# Patient Record
Sex: MALE | Race: WHITE
[De-identification: names, ages, dates, MRNs, and addresses within clinical notes are randomized per-mention and may not be internally consistent; named-entity substitution may affect disease eponyms.]

---

## 2019-01-15 ENCOUNTER — HOSPITAL ENCOUNTER (OUTPATIENT)
Dept: HOSPITAL 84 - D.HCCARDIO | Age: 84
Discharge: HOME | End: 2019-01-15
Attending: INTERNAL MEDICINE
Payer: MEDICARE

## 2019-01-15 VITALS — BODY MASS INDEX: 25.8 KG/M2

## 2019-01-15 DIAGNOSIS — I25.119: Primary | ICD-10-CM

## 2019-01-15 DIAGNOSIS — I10: ICD-10-CM

## 2019-01-16 NOTE — ST
PATIENT:CINDI DIALLO                       MEDICAL RECORD: [id number]
SEX: M                                                   [facility]         
ORDER #:                                                 ADMISSION DATE: 01/15/19
AGE OF PATIENT: 83            
 
REFERRING PHYSICIAN:                                                             
 
INTERPRETING PHYSICIAN: JOCELIN BARBA MD             

 
 
DATE OF SERVICE:  01/15/2019
 
PROCEDURE:  Nuclear Stress Test.
 
INDICATION:  Angina, coronary artery disease, shortness of breath, and
hypertension.  It was pharmacologic.
 
DESCRIPTION:  The patient was exercised on standard Lexiscan protocol with 30
mCi of sestamibi injected at peak stress, 11 mCi were used previously for rest
images.
 
FINDINGS:  Gated SPECT reveals an excellent ejection fraction at 67% with good
wall motion thickening and brightness throughout the segments. 
 
SPECT IMAGING:  Cardiolite was used for a myocardial perfusion agent.  There is
homogeneous uptake throughout all segments at rest and stress with no evidence
of inducible ischemia or previous infarction. 
 
OVERALL IMPRESSION:  This is a normal rest-stress Cardiolite with no evidence of
inducible ischemia or previous infarction.  A gated SPECT reveals preserved
ejection fraction greater than 60%. 
 
At this time I would evaluate noncardiac etiology of chest pain.
 
TRANSINT:[id number] Voice Confirmation ID: [id number] DOCUMENT ID: [id number]
 
 
                                           
                                           JOCELIN BARBA MD             
 
 
 
Electronically Signed by JOCELIN BARBA on 01/16/19 at 1639
 
 
 
 
 
 
CC: VICENTE CAMERON MD                                            [id number]
DICTATION DATE: 01/15/19 1613     :     01/16/19 0746      [facility] CLI 
                                                                      01/15/19
[facility]                                          
[address] [place] [address]

## 2019-04-29 ENCOUNTER — HOSPITAL ENCOUNTER (OUTPATIENT)
Dept: HOSPITAL 84 - D.ECHO | Age: 84
Discharge: HOME | End: 2019-04-29
Attending: INTERNAL MEDICINE
Payer: MEDICARE

## 2019-04-29 VITALS — BODY MASS INDEX: 25.8 KG/M2

## 2019-04-29 DIAGNOSIS — D64.9: ICD-10-CM

## 2019-04-29 DIAGNOSIS — D47.1: ICD-10-CM

## 2019-04-29 DIAGNOSIS — C91.10: Primary | ICD-10-CM

## 2019-05-01 NOTE — EC
PATIENT:CINDI DIALLO             DATE OF SERVICE: 04/29/19
SEX: M                                  MEDICAL RECORD: [id number]
DATE OF BIRTH: [birth year]                        [facility]          
AGE OF PATIENT: 83                             ADMISSION DATE: 04/29/19
 
REFERRING PHYSICIAN:                               
 
INTERPRETING PHYSICIAN: JOCELIN JENKINS MD             
 
 
 
                             ECHOCARDIOGRAM REPORT
  ECHO CHARGES 4               ECHO COMPLETE                 Date: 04/29/19
 
 
 
CLINICAL DIAGNOSIS: ASSESS EF, EDEMA              
 
                         ECHOCARDIOGRAPHIC MEASUREMENTS
      (adult normal given)
   AC root (d.<3.7cm) 3.1  cm   LV Septum d (<1.2 cm> 1.4  cm
      Valve Excursion 1.7  cm     LV Septum (systole) 1.5  cm
Left Atria (s.<4.0cm> 3.9  cm          LVPW d(<1.2cm) 1.1  cm
        RV (d.<2.3cm) 3.1  cm           LVPW (sytole) 1.8  cm
  LV diastole(<5.6CM) 5.6  cm       MV E-F(>70mm/sec)      cm
           LV systole 4.3  cm           LVOT Diameter 1.8  cm
       MV exc.(>10mm)      cm
Est.ejection fraction (50-75%)     %
 
   DOPPLER:
     LVIT      cm/sec A 62   cm/sec E 62    cm/sec
       LA      cm/sec      RVSP 38.6 mmHg
     LVOT 101  cm/sec   AOP1/2T      m/s
  Asc. Ao 135  cm/sec
     RVOT 48   cm/sec
       RA      cm/sec
       PA 89   cm/sec
 AV Gradient Peak 7.3  mmHg  AV Mean 3.8  mmHg  AV Area 2.0  cm
 MV Gradient Peak 2.8  mmHg  MV Mean 1.3  mmHg  MV Area      cm
   COMMENTS:                                              
 
 
 Cardiac Sonographer: Dottie JONES             
      Cardiologist: 1          Dr. Jenkins                
             TAPE# PACS           
                                       Pericardial Effusion N                        
 
 
DATE OF SERVICE:  04/29/2019
 
FINDINGS:
1.  Left ventricular chamber size is within normal limits.  Left ventricular
systolic function is normal.  Overall ejection fraction is estimated at 55%.
2.  Left atrium is within normal limits.  Right atrium and right ventricular
chamber sizes are mildly dilated.
3.  Valvular structures have normal structure and motion.
4.  Doppler interrogation reveals mild mitral regurgitation and mild tricuspid
regurgitation.  No other valvular insufficiency or stenosis.  Pulmonary systolic
 
 
 
ECHOCARDIOGRAM REPORT                          [id number]    CINDI DIALLO     
 
 
pressure is estimated at 38 mmHg.
5.  No evidence of pericardial effusion or left ventricular thrombus.
 
TRANSINT:[id number] Voice Confirmation ID: [id number] DOCUMENT ID: [id number]
                                           
                                           JOCELIN JENKINS MD             
 
 
 
Electronically Signed by JOCELIN JENKINS on 05/01/19 at 1039
 
 
 
 
 
 
 
 
 
 
 
 
 
 
 
 
 
 
 
 
 
 
 
 
 
 
 
 
 
 
 
 
 
 
 
 
CC:                                                             [id number]
DICTATION DATE: 04/29/19 1210     :     04/29/19 1417      DEP CLI 
                                                                      04/29/19
[place] [id number] [place] [phone]

## 2019-10-22 ENCOUNTER — HOSPITAL ENCOUNTER (OUTPATIENT)
Dept: HOSPITAL 84 - D.ECHO | Age: 84
Discharge: HOME | End: 2019-10-22
Attending: ORTHOPAEDIC SURGERY
Payer: OTHER GOVERNMENT

## 2019-10-22 VITALS — BODY MASS INDEX: 25.8 KG/M2

## 2019-10-22 DIAGNOSIS — I25.9: ICD-10-CM

## 2019-10-22 DIAGNOSIS — C91.10: Primary | ICD-10-CM

## 2019-10-22 LAB
BASOPHILS NFR BLD AUTO: 0.2 % (ref 0–2)
EOSINOPHIL NFR BLD: 0 % (ref 0–7)
ERYTHROCYTE [DISTWIDTH] IN BLOOD BY AUTOMATED COUNT: 15.5 % (ref 11.5–14.5)
HCT VFR BLD CALC: 39.5 % (ref 42–54)
HGB BLD-MCNC: 12.7 G/DL (ref 13.5–17.5)
IMM GRANULOCYTES NFR BLD: 0.2 % (ref 0–5)
LYMPHOCYTES NFR BLD AUTO: 16.7 % (ref 15–50)
MCH RBC QN AUTO: 28.5 PG (ref 26–34)
MCHC RBC AUTO-ENTMCNC: 32.2 G/DL (ref 31–37)
MCV RBC: 88.8 FL (ref 80–100)
MONOCYTES NFR BLD: 10.4 % (ref 2–11)
NEUTROPHILS NFR BLD AUTO: 72.5 % (ref 40–80)
PLATELET # BLD: 113 10X3/UL (ref 130–400)
PMV BLD AUTO: 9.5 FL (ref 7.4–10.4)
RBC # BLD AUTO: 4.45 10X6/UL (ref 4.2–6.1)
WBC # BLD AUTO: 4.2 10X3/UL (ref 4.8–10.8)

## 2019-10-29 NOTE — EC
PATIENT:CINDI DIALLO             DATE OF SERVICE: 10/22/19
SEX: M                                  MEDICAL RECORD: [id number]
DATE OF BIRTH: [birth year]                        [facility]          
AGE OF PATIENT: 83                             ADMISSION DATE: 10/22/19
 
REFERRING PHYSICIAN:                               
 
INTERPRETING PHYSICIAN: JOCELIN JENKINS MD             
 
 
 
                             ECHOCARDIOGRAM REPORT
  ECHO CHARGES 4               ECHO COMPLETE                 Date: 10/22/19
 
 
 
CLINICAL DIAGNOSIS: ISHEMIC HEART DISEASE,        
                    LEUKEMIA                      
                         ECHOCARDIOGRAPHIC MEASUREMENTS
      (adult normal given)
   AC root (d.<3.7cm) 3.1  cm   LV Septum d (<1.2 cm> 0.8  cm
      Valve Excursion 1.7  cm     LV Septum (systole) 1.5  cm
Left Atria (s.<4.0cm> 4.1  cm          LVPW d(<1.2cm) 1.3  cm
        RV (d.<2.3cm) 3.3  cm           LVPW (sytole) 1.8  cm
  LV diastole(<5.6CM) 6.3  cm       MV E-F(>70mm/sec)      cm
           LV systole 3.8  cm           LVOT Diameter 2.0  cm
       MV exc.(>10mm)      cm
Est.ejection fraction (50-75%)     %
 
   DOPPLER:
     LVIT      cm/sec A 52   cm/sec E 39    cm/sec
       LA      cm/sec      RVSP 29.6 mmHg
     LVOT 93   cm/sec   AOP1/2T      m/s
  Asc. Ao 130  cm/sec
     RVOT 49   cm/sec
       RA      cm/sec
       PA 86   cm/sec
 AV Gradient Peak 6.8  mmHg  AV Mean 3.5  mmHg  AV Area 2.2  cm
 MV Gradient Peak 2.3  mmHg  MV Mean 0.8  mmHg  MV Area      cm
   COMMENTS:                                              
 
 
 Cardiac Sonographer: Dottie JONES             
      Cardiologist: 1          Dr. Jenkins                
             TAPE# PACS           
                                       Pericardial Effusion N                        
 
 
DATE OF SERVICE:  10/22/2019
 
FINDINGS:
1.  Left ventricular chamber size is mildly dilated.  Left ventricular systolic
function is preserved at 55%.
2.  Left atrium is enlarged at 4.1 cm.  Right atrium and right ventricular
chamber sizes are as well mildly dilated.
3.  Valvular structures have normal structure and motion.
4.  Doppler interrogation reveals mild mitral regurgitation, no other valvular
insufficiency or stenosis.  Pulmonary systolic pressure is estimated at 30 mmHg.
 
 
 
ECHOCARDIOGRAM REPORT                          [id number]    CINDI DIALLO     
 
 
5.  No evidence of pericardial effusion or left ventricular thrombus.
 
TRANSINT:[id number] Voice Confirmation ID: [id number] DOCUMENT ID: [id number]
                                           
                                           JOCELIN JENKINS MD             
 
 
 
Electronically Signed by JOCELIN JENKINS on 10/29/19 at 1140
 
 
 
 
 
 
 
 
 
 
 
 
 
 
 
 
 
 
 
 
 
 
 
 
 
 
 
 
 
 
 
 
 
 
 
 
 
CC:                                                             [id number]
DICTATION DATE: 10/22/19 1215     :     10/22/19 1224      DEP CLI 
                                                                      10/22/19
[place] [id number] [place] [phone]

## 2020-05-08 ENCOUNTER — HOSPITAL ENCOUNTER (OUTPATIENT)
Dept: HOSPITAL 84 - D.HCCECHO | Age: 85
Discharge: HOME | End: 2020-05-08
Attending: INTERNAL MEDICINE
Payer: MEDICARE

## 2020-05-08 VITALS — BODY MASS INDEX: 25.8 KG/M2

## 2020-05-08 DIAGNOSIS — I25.10: Primary | ICD-10-CM

## 2020-05-11 ENCOUNTER — HOSPITAL ENCOUNTER (OUTPATIENT)
Dept: HOSPITAL 84 - D.HCCARDIO | Age: 85
Discharge: HOME | End: 2020-05-11
Attending: INTERNAL MEDICINE
Payer: MEDICARE

## 2020-05-11 VITALS — BODY MASS INDEX: 25.8 KG/M2

## 2020-05-11 DIAGNOSIS — I25.10: Primary | ICD-10-CM

## 2020-05-22 ENCOUNTER — HOSPITAL ENCOUNTER (OUTPATIENT)
Dept: HOSPITAL 84 - D.OPS | Age: 85
Discharge: HOME | End: 2020-05-22
Attending: INTERNAL MEDICINE
Payer: MEDICARE

## 2020-05-22 VITALS — BODY MASS INDEX: 23.79 KG/M2 | BODY MASS INDEX: 23.79 KG/M2 | HEIGHT: 74 IN | WEIGHT: 185.39 LBS

## 2020-05-22 DIAGNOSIS — D69.6: Primary | ICD-10-CM

## 2020-05-22 NOTE — NUR
1420 PT DENIES RASH,SOB OR CP. STATES HE FEELS FINE AFTER TRANSFUSION
OF PLATELETS.  IV DC'D. CATHETER TIP INTACT. PRESSURE HELD FOR 5
MINUTES. NO BLEEDING AT SITE AFTER HOLDING PRESSURE. COBAN DRESSING
APPLIED. PT READY FOR DISCHARGE HOME

## 2020-05-29 ENCOUNTER — HOSPITAL ENCOUNTER (OUTPATIENT)
Dept: HOSPITAL 84 - D.OPS | Age: 85
Discharge: HOME | End: 2020-05-29
Attending: INTERNAL MEDICINE
Payer: MEDICARE

## 2020-05-29 VITALS — BODY MASS INDEX: 23.8 KG/M2

## 2020-05-29 DIAGNOSIS — D69.6: Primary | ICD-10-CM

## 2020-06-04 ENCOUNTER — HOSPITAL ENCOUNTER (OUTPATIENT)
Dept: HOSPITAL 84 - D.US | Age: 85
Discharge: HOME | End: 2020-06-04
Attending: INTERNAL MEDICINE
Payer: MEDICARE

## 2020-06-04 VITALS — BODY MASS INDEX: 23.8 KG/M2

## 2020-06-04 DIAGNOSIS — D47.1: ICD-10-CM

## 2020-06-04 DIAGNOSIS — D69.6: ICD-10-CM

## 2020-06-04 DIAGNOSIS — D63.1: ICD-10-CM

## 2020-06-04 DIAGNOSIS — D64.9: ICD-10-CM

## 2020-06-04 DIAGNOSIS — N18.3: ICD-10-CM

## 2020-06-04 DIAGNOSIS — C91.10: Primary | ICD-10-CM

## 2020-06-04 DIAGNOSIS — C91.11: ICD-10-CM
